# Patient Record
Sex: FEMALE | Race: WHITE | Employment: FULL TIME | URBAN - METROPOLITAN AREA
[De-identification: names, ages, dates, MRNs, and addresses within clinical notes are randomized per-mention and may not be internally consistent; named-entity substitution may affect disease eponyms.]

---

## 2024-05-21 LAB
C. TRACHOMATIS, EXTERNAL RESULT: NEGATIVE
N. GONORRHOEAE, EXTERNAL RESULT: NEGATIVE

## 2024-06-06 LAB
HEP B, EXTERNAL RESULT: NEGATIVE
HEPATITIS C ANTIBODY, EXTERNAL RESULT: NEGATIVE
HIV, EXTERNAL RESULT: NONREACTIVE
RUBELLA TITER, EXTERNAL RESULT: NORMAL

## 2024-10-22 ENCOUNTER — ANESTHESIA (OUTPATIENT)
Dept: LABOR AND DELIVERY | Age: 34
End: 2024-10-22
Payer: COMMERCIAL

## 2024-10-22 ENCOUNTER — HOSPITAL ENCOUNTER (INPATIENT)
Age: 34
LOS: 2 days | Discharge: HOME | End: 2024-10-24
Attending: OBSTETRICS & GYNECOLOGY | Admitting: OBSTETRICS & GYNECOLOGY
Payer: COMMERCIAL

## 2024-10-22 ENCOUNTER — ANESTHESIA EVENT (OUTPATIENT)
Dept: LABOR AND DELIVERY | Age: 34
End: 2024-10-22
Payer: COMMERCIAL

## 2024-10-22 PROBLEM — O14.13 PREECLAMPSIA, SEVERE, THIRD TRIMESTER: Status: ACTIVE | Noted: 2024-10-22

## 2024-10-22 LAB
ABO + RH BLD: NORMAL
ALBUMIN SERPL-MCNC: 2.9 G/DL (ref 3.4–5)
ALP SERPL-CCNC: 154 U/L (ref 40–129)
ALT SERPL-CCNC: 191 U/L (ref 10–40)
AMPHETAMINES UR QL SCN>1000 NG/ML: ABNORMAL
AST SERPL-CCNC: 307 U/L (ref 15–37)
BARBITURATES UR QL SCN>200 NG/ML: ABNORMAL
BENZODIAZ UR QL SCN>200 NG/ML: ABNORMAL
BILIRUB DIRECT SERPL-MCNC: <0.1 MG/DL (ref 0–0.3)
BILIRUB INDIRECT SERPL-MCNC: 0.2 MG/DL (ref 0–1)
BILIRUB SERPL-MCNC: 0.3 MG/DL (ref 0–1)
BLD GP AB SCN SERPL QL: NORMAL
BUN SERPL-MCNC: 9 MG/DL (ref 7–20)
CANNABINOIDS UR QL SCN>50 NG/ML: POSITIVE
COCAINE UR QL SCN: ABNORMAL
CREAT SERPL-MCNC: 0.5 MG/DL (ref 0.6–1.1)
CREAT UR-MCNC: 98.9 MG/DL (ref 28–259)
DEPRECATED RDW RBC AUTO: 16.3 % (ref 12.4–15.4)
DRUG SCREEN COMMENT UR-IMP: ABNORMAL
FENTANYL SCREEN, URINE: ABNORMAL
GFR SERPLBLD CREATININE-BSD FMLA CKD-EPI: >90 ML/MIN/{1.73_M2}
HCT VFR BLD AUTO: 32.1 % (ref 36–48)
HGB BLD-MCNC: 10.5 G/DL (ref 12–16)
MCH RBC QN AUTO: 23.9 PG (ref 26–34)
MCHC RBC AUTO-ENTMCNC: 32.7 G/DL (ref 31–36)
MCV RBC AUTO: 73.2 FL (ref 80–100)
METHADONE UR QL SCN>300 NG/ML: ABNORMAL
OPIATES UR QL SCN>300 NG/ML: POSITIVE
OXYCODONE UR QL SCN: ABNORMAL
PCP UR QL SCN>25 NG/ML: ABNORMAL
PH UR STRIP: 6 [PH]
PLATELET # BLD AUTO: 136 K/UL (ref 135–450)
PMV BLD AUTO: 10 FL (ref 5–10.5)
PROT SERPL-MCNC: 5.6 G/DL (ref 6.4–8.2)
PROT UR-MCNC: 429 MG/DL
PROT/CREAT UR-RTO: 4.3 MG/DL
RBC # BLD AUTO: 4.38 M/UL (ref 4–5.2)
REAGIN+T PALLIDUM IGG+IGM SERPL-IMP: NORMAL
WBC # BLD AUTO: 10 K/UL (ref 4–11)

## 2024-10-22 PROCEDURE — 80307 DRUG TEST PRSMV CHEM ANLYZR: CPT

## 2024-10-22 PROCEDURE — 59025 FETAL NON-STRESS TEST: CPT

## 2024-10-22 PROCEDURE — 82565 ASSAY OF CREATININE: CPT

## 2024-10-22 PROCEDURE — 1220000000 HC SEMI PRIVATE OB R&B

## 2024-10-22 PROCEDURE — 84520 ASSAY OF UREA NITROGEN: CPT

## 2024-10-22 PROCEDURE — 82570 ASSAY OF URINE CREATININE: CPT

## 2024-10-22 PROCEDURE — 86850 RBC ANTIBODY SCREEN: CPT

## 2024-10-22 PROCEDURE — 86900 BLOOD TYPING SEROLOGIC ABO: CPT

## 2024-10-22 PROCEDURE — 6360000002 HC RX W HCPCS: Performed by: OBSTETRICS & GYNECOLOGY

## 2024-10-22 PROCEDURE — 3E033VJ INTRODUCTION OF OTHER HORMONE INTO PERIPHERAL VEIN, PERCUTANEOUS APPROACH: ICD-10-PCS | Performed by: OBSTETRICS & GYNECOLOGY

## 2024-10-22 PROCEDURE — 88307 TISSUE EXAM BY PATHOLOGIST: CPT

## 2024-10-22 PROCEDURE — 86780 TREPONEMA PALLIDUM: CPT

## 2024-10-22 PROCEDURE — 84156 ASSAY OF PROTEIN URINE: CPT

## 2024-10-22 PROCEDURE — 80076 HEPATIC FUNCTION PANEL: CPT

## 2024-10-22 PROCEDURE — 87081 CULTURE SCREEN ONLY: CPT

## 2024-10-22 PROCEDURE — 6360000002 HC RX W HCPCS

## 2024-10-22 PROCEDURE — 86901 BLOOD TYPING SEROLOGIC RH(D): CPT

## 2024-10-22 PROCEDURE — 2580000003 HC RX 258: Performed by: OBSTETRICS & GYNECOLOGY

## 2024-10-22 PROCEDURE — 6370000000 HC RX 637 (ALT 250 FOR IP): Performed by: OBSTETRICS & GYNECOLOGY

## 2024-10-22 PROCEDURE — 7200000001 HC VAGINAL DELIVERY

## 2024-10-22 PROCEDURE — 85027 COMPLETE CBC AUTOMATED: CPT

## 2024-10-22 RX ORDER — SODIUM CHLORIDE 9 MG/ML
INJECTION, SOLUTION INTRAVENOUS PRN
Status: DISCONTINUED | OUTPATIENT
Start: 2024-10-22 | End: 2024-10-22

## 2024-10-22 RX ORDER — ONDANSETRON 2 MG/ML
4 INJECTION INTRAMUSCULAR; INTRAVENOUS EVERY 6 HOURS PRN
Status: DISCONTINUED | OUTPATIENT
Start: 2024-10-22 | End: 2024-10-22

## 2024-10-22 RX ORDER — SODIUM CHLORIDE 9 MG/ML
INJECTION, SOLUTION INTRAVENOUS PRN
Status: DISCONTINUED | OUTPATIENT
Start: 2024-10-22 | End: 2024-10-24 | Stop reason: HOSPADM

## 2024-10-22 RX ORDER — LABETALOL HYDROCHLORIDE 5 MG/ML
40 INJECTION, SOLUTION INTRAVENOUS
Status: COMPLETED | OUTPATIENT
Start: 2024-10-22 | End: 2024-10-22

## 2024-10-22 RX ORDER — CALCIUM GLUCONATE 94 MG/ML
1000 INJECTION, SOLUTION INTRAVENOUS PRN
Status: DISCONTINUED | OUTPATIENT
Start: 2024-10-22 | End: 2024-10-22

## 2024-10-22 RX ORDER — SODIUM CHLORIDE 0.9 % (FLUSH) 0.9 %
5-40 SYRINGE (ML) INJECTION EVERY 12 HOURS SCHEDULED
Status: DISCONTINUED | OUTPATIENT
Start: 2024-10-22 | End: 2024-10-22

## 2024-10-22 RX ORDER — NICOTINE 21 MG/24HR
1 PATCH, TRANSDERMAL 24 HOURS TRANSDERMAL DAILY
Status: DISCONTINUED | OUTPATIENT
Start: 2024-10-22 | End: 2024-10-22

## 2024-10-22 RX ORDER — NIFEDIPINE 30 MG/1
30 TABLET, EXTENDED RELEASE ORAL DAILY
Status: DISCONTINUED | OUTPATIENT
Start: 2024-10-22 | End: 2024-10-24

## 2024-10-22 RX ORDER — ONDANSETRON 2 MG/ML
4 INJECTION INTRAMUSCULAR; INTRAVENOUS EVERY 6 HOURS PRN
Status: DISCONTINUED | OUTPATIENT
Start: 2024-10-22 | End: 2024-10-24 | Stop reason: HOSPADM

## 2024-10-22 RX ORDER — OXYCODONE HYDROCHLORIDE 5 MG/1
5 TABLET ORAL EVERY 4 HOURS PRN
Status: DISCONTINUED | OUTPATIENT
Start: 2024-10-22 | End: 2024-10-24 | Stop reason: HOSPADM

## 2024-10-22 RX ORDER — SODIUM CHLORIDE 0.9 % (FLUSH) 0.9 %
5-40 SYRINGE (ML) INJECTION PRN
Status: DISCONTINUED | OUTPATIENT
Start: 2024-10-22 | End: 2024-10-22

## 2024-10-22 RX ORDER — NALOXONE HYDROCHLORIDE 0.4 MG/ML
INJECTION, SOLUTION INTRAMUSCULAR; INTRAVENOUS; SUBCUTANEOUS PRN
Status: DISCONTINUED | OUTPATIENT
Start: 2024-10-22 | End: 2024-10-22

## 2024-10-22 RX ORDER — MORPHINE SULFATE 4 MG/ML
INJECTION INTRAVENOUS
Status: COMPLETED
Start: 2024-10-22 | End: 2024-10-22

## 2024-10-22 RX ORDER — MORPHINE SULFATE 4 MG/ML
4 INJECTION, SOLUTION INTRAMUSCULAR; INTRAVENOUS
Status: COMPLETED | OUTPATIENT
Start: 2024-10-22 | End: 2024-10-22

## 2024-10-22 RX ORDER — SODIUM CHLORIDE, SODIUM LACTATE, POTASSIUM CHLORIDE, CALCIUM CHLORIDE 600; 310; 30; 20 MG/100ML; MG/100ML; MG/100ML; MG/100ML
INJECTION, SOLUTION INTRAVENOUS CONTINUOUS
Status: DISCONTINUED | OUTPATIENT
Start: 2024-10-22 | End: 2024-10-22

## 2024-10-22 RX ORDER — SODIUM CHLORIDE 0.9 % (FLUSH) 0.9 %
5-40 SYRINGE (ML) INJECTION EVERY 12 HOURS SCHEDULED
Status: DISCONTINUED | OUTPATIENT
Start: 2024-10-22 | End: 2024-10-24 | Stop reason: HOSPADM

## 2024-10-22 RX ORDER — ACETAMINOPHEN 500 MG
1000 TABLET ORAL EVERY 6 HOURS PRN
Status: DISCONTINUED | OUTPATIENT
Start: 2024-10-22 | End: 2024-10-24 | Stop reason: HOSPADM

## 2024-10-22 RX ORDER — MAGNESIUM SULFATE IN WATER 40 MG/ML
4000 INJECTION, SOLUTION INTRAVENOUS ONCE
Status: COMPLETED | OUTPATIENT
Start: 2024-10-22 | End: 2024-10-22

## 2024-10-22 RX ORDER — LABETALOL HYDROCHLORIDE 5 MG/ML
80 INJECTION, SOLUTION INTRAVENOUS
Status: COMPLETED | OUTPATIENT
Start: 2024-10-22 | End: 2024-10-22

## 2024-10-22 RX ORDER — BETAMETHASONE SODIUM PHOSPHATE AND BETAMETHASONE ACETATE 3; 3 MG/ML; MG/ML
12 INJECTION, SUSPENSION INTRA-ARTICULAR; INTRALESIONAL; INTRAMUSCULAR; SOFT TISSUE ONCE
Status: COMPLETED | OUTPATIENT
Start: 2024-10-22 | End: 2024-10-22

## 2024-10-22 RX ORDER — DOCUSATE SODIUM 100 MG/1
100 CAPSULE, LIQUID FILLED ORAL 2 TIMES DAILY
Status: DISCONTINUED | OUTPATIENT
Start: 2024-10-22 | End: 2024-10-24 | Stop reason: HOSPADM

## 2024-10-22 RX ORDER — LABETALOL HYDROCHLORIDE 5 MG/ML
20 INJECTION, SOLUTION INTRAVENOUS ONCE
Status: COMPLETED | OUTPATIENT
Start: 2024-10-22 | End: 2024-10-22

## 2024-10-22 RX ORDER — ONDANSETRON 4 MG/1
4 TABLET, ORALLY DISINTEGRATING ORAL EVERY 6 HOURS PRN
Status: DISCONTINUED | OUTPATIENT
Start: 2024-10-22 | End: 2024-10-24 | Stop reason: HOSPADM

## 2024-10-22 RX ORDER — NALBUPHINE HYDROCHLORIDE 10 MG/ML
5 INJECTION INTRAMUSCULAR; INTRAVENOUS; SUBCUTANEOUS EVERY 4 HOURS PRN
Status: DISCONTINUED | OUTPATIENT
Start: 2024-10-22 | End: 2024-10-22

## 2024-10-22 RX ORDER — HYDRALAZINE HYDROCHLORIDE 20 MG/ML
5 INJECTION INTRAMUSCULAR; INTRAVENOUS ONCE
Status: COMPLETED | OUTPATIENT
Start: 2024-10-22 | End: 2024-10-22

## 2024-10-22 RX ORDER — DIPHENHYDRAMINE HYDROCHLORIDE 50 MG/ML
25 INJECTION INTRAMUSCULAR; INTRAVENOUS EVERY 6 HOURS PRN
Status: DISCONTINUED | OUTPATIENT
Start: 2024-10-22 | End: 2024-10-22

## 2024-10-22 RX ORDER — IBUPROFEN 800 MG/1
800 TABLET, FILM COATED ORAL EVERY 8 HOURS PRN
Status: DISCONTINUED | OUTPATIENT
Start: 2024-10-22 | End: 2024-10-24 | Stop reason: HOSPADM

## 2024-10-22 RX ORDER — SODIUM CHLORIDE 0.9 % (FLUSH) 0.9 %
5-40 SYRINGE (ML) INJECTION PRN
Status: DISCONTINUED | OUTPATIENT
Start: 2024-10-22 | End: 2024-10-24 | Stop reason: HOSPADM

## 2024-10-22 RX ADMIN — Medication 87.3 MILLI-UNITS/MIN: at 17:59

## 2024-10-22 RX ADMIN — LABETALOL HYDROCHLORIDE 40 MG: 5 INJECTION, SOLUTION INTRAVENOUS at 08:48

## 2024-10-22 RX ADMIN — HYDRALAZINE HYDROCHLORIDE 5 MG: 20 INJECTION INTRAMUSCULAR; INTRAVENOUS at 09:52

## 2024-10-22 RX ADMIN — LABETALOL HYDROCHLORIDE 80 MG: 5 INJECTION INTRAVENOUS at 09:17

## 2024-10-22 RX ADMIN — BETAMETHASONE SODIUM PHOSPHATE AND BETAMETHASONE ACETATE 12 MG: 3; 3 INJECTION, SUSPENSION INTRA-ARTICULAR; INTRALESIONAL; INTRAMUSCULAR at 10:49

## 2024-10-22 RX ADMIN — MAGNESIUM SULFATE HEPTAHYDRATE 2000 MG/HR: 40 INJECTION, SOLUTION INTRAVENOUS at 09:26

## 2024-10-22 RX ADMIN — AMPICILLIN SODIUM 1000 MG: 1 INJECTION, POWDER, FOR SOLUTION INTRAMUSCULAR; INTRAVENOUS at 14:14

## 2024-10-22 RX ADMIN — Medication 166.7 ML: at 17:59

## 2024-10-22 RX ADMIN — SODIUM CHLORIDE, POTASSIUM CHLORIDE, SODIUM LACTATE AND CALCIUM CHLORIDE: 600; 310; 30; 20 INJECTION, SOLUTION INTRAVENOUS at 08:36

## 2024-10-22 RX ADMIN — MORPHINE SULFATE 4 MG: 4 INJECTION, SOLUTION INTRAMUSCULAR; INTRAVENOUS at 08:53

## 2024-10-22 RX ADMIN — IBUPROFEN 800 MG: 800 TABLET, FILM COATED ORAL at 21:14

## 2024-10-22 RX ADMIN — Medication 1 MILLI-UNITS/MIN: at 10:29

## 2024-10-22 RX ADMIN — AMPICILLIN SODIUM 2000 MG: 2 INJECTION, POWDER, FOR SOLUTION INTRAMUSCULAR; INTRAVENOUS at 10:05

## 2024-10-22 RX ADMIN — MAGNESIUM SULFATE HEPTAHYDRATE 2000 MG/HR: 40 INJECTION, SOLUTION INTRAVENOUS at 18:07

## 2024-10-22 RX ADMIN — SODIUM CHLORIDE, POTASSIUM CHLORIDE, SODIUM LACTATE AND CALCIUM CHLORIDE: 600; 310; 30; 20 INJECTION, SOLUTION INTRAVENOUS at 09:27

## 2024-10-22 RX ADMIN — MAGNESIUM SULFATE HEPTAHYDRATE 4000 MG: 40 INJECTION, SOLUTION INTRAVENOUS at 09:01

## 2024-10-22 RX ADMIN — LABETALOL HYDROCHLORIDE 20 MG: 5 INJECTION, SOLUTION INTRAVENOUS at 08:31

## 2024-10-22 RX ADMIN — NIFEDIPINE 30 MG: 30 TABLET, FILM COATED, EXTENDED RELEASE ORAL at 19:25

## 2024-10-22 RX ADMIN — ONDANSETRON 4 MG: 2 INJECTION INTRAMUSCULAR; INTRAVENOUS at 08:34

## 2024-10-22 ASSESSMENT — PAIN - FUNCTIONAL ASSESSMENT: PAIN_FUNCTIONAL_ASSESSMENT: ACTIVITIES ARE NOT PREVENTED

## 2024-10-22 ASSESSMENT — PAIN DESCRIPTION - LOCATION: LOCATION: ABDOMEN

## 2024-10-22 ASSESSMENT — PAIN SCALES - GENERAL
PAINLEVEL_OUTOF10: 10
PAINLEVEL_OUTOF10: 0

## 2024-10-22 ASSESSMENT — PAIN DESCRIPTION - DESCRIPTORS: DESCRIPTORS: CRAMPING

## 2024-10-22 NOTE — FLOWSHEET NOTE
Papito ZALDIVAR able to start 20g IV. Labetalol and Zofran given per Dr Billings orders.  Unable to get enough blood return for ordered labs. Papito ZALDIVAR remains at bedside to attempt lab collection.

## 2024-10-22 NOTE — FLOWSHEET NOTE
1817 Dr. Billings notified of /86, 162/84.  No new BP orders at this time. Mag continuing to infuse as ordered.

## 2024-10-22 NOTE — FLOWSHEET NOTE
MD Billings reviewing labs, going to bedside to speak with family, Grossman to be placed, Pitocin being started for induction

## 2024-10-22 NOTE — FLOWSHEET NOTE
PT MOVED TO ROOM 2286, ORIENTED TO ROOM, FOB PHONED AND COMING TO MD FRANCISCA IN ROOM, PT AGREEABLE TO V/E. LAB AT BS COLLECTING LABS

## 2024-10-22 NOTE — L&D DELIVERY NOTE
Department of Obstetrics and Gynecology  Induced Vaginal Delivery Note    Audrey Kirby,[id number]    PRENATAL CARE: Complicated by: HELLP, AMA, Hx PTD, Anemia    Pre-operative Diagnosis:   pregnancy <37 weeks, Induced labor, Single fetus, and HELLP       Post-operative Diagnosis: the same    Type of induction: pitocin    Additional Procedures: none     Information for the patient's :  Vijay Kirby [[id number]]                  Infant Wt:   Information for the patient's :  Vijay Kirby [[id number]]          APGARS:     Information for the patient's :  Vijay Kirby [[id number]]         Anesthesia:  none    Specimen:  Placenta sent to pathology Yes    Estimated blood loss: 100 cc     Condition: mother stable in LDR. Infant to [facility] for prematurity.    Infant Feeding: breast    Delivery Summary: Patient is Audrey Kirby  is a 33 y.o.  female at 32w5d admitted to Labor and Delivery room for induction of labor following presentation to triage with severe range BP and epigastric pain, diagnosed with HELLP. After FHT were confirmed to be reassuring the induction proceeded with pitocin by protocol. Contractions were regular, FHT reactive with moderate variability without decels. Pt did not received epidural anesthesia. Recurrent deep variables noted just prior to delivery. Progress of labor as anticipated and now fully dilated cervix. With subsequent contractions she started pushing and delivered vaginally spontaneously with no complications. Tight nuchal cord present which was delivered through. 30 Units of Pitocin in 500 ml of LR was started IV. Placenta, cord and membranes were delivered spontaneously within less than 15 minutes. Uterus was not explored.  Vaginal walls, cervix, perineum, rectum were intact on inspection. Uterus was well contracted. Vaginal sweep was done, laps count was correct. Mother left stable to recovery.     Electronically

## 2024-10-22 NOTE — PROGRESS NOTES
D/W pt starting a 2nd IV due to both pitocin and MgSO4 running.  Both myself and RN have encouraged this.  Pt refuses 2nd IV at this time.

## 2024-10-22 NOTE — FLOWSHEET NOTE
Dr. Billings notified of 160/85 @ 1222, 148/91 @ 1233. CRNA discussed second line, declines currently at this time.

## 2024-10-22 NOTE — H&P
Department of Obstetrics and Gynecology   Obstetrics History and Physical        CHIEF COMPLAINT:  my stomach hurts    HISTORY OF PRESENT ILLNESS:    Audrey Kirby  is a 33 y.o.  female at 32w5d presents with a chief complaint as above and is being admitted for suspected HELLP syndrome. States work up at 5AM with severe epigastric pain and N/V. Reports pain is 10/10. +FM, denies ctxs, LOF or VB.    Estimated Due Date: Estimated Date of Delivery: 24    PRENATAL CARE: Complicated by: AMA, Anemia, Hx PTD @ 31wks    PAST OB HISTORY:  OB History          1    Para        Term                AB        Living             SAB        IAB        Ectopic        Molar        Multiple        Live Births                  Past Medical History:        Diagnosis Date    Crohn's colitis (HCC)     Dx     Seasonal asthma      Past Surgical History:    No past surgical history on file.  Allergies:  Patient has no known allergies.  Social History:    Social History     Socioeconomic History    Marital status:      Spouse name: Not on file    Number of children: 3    Years of education: 12    Highest education level: Associate degree: academic program   Occupational History    Occupation: unemployed   Tobacco Use    Smoking status: Never    Smokeless tobacco: Never   Vaping Use    Vaping status: Never Used   Substance and Sexual Activity    Alcohol use: Never    Drug use: Yes     Types: Marijuana (Weed)     Comment: Medical    Sexual activity: Yes     Partners: Male   Other Topics Concern    Not on file   Social History Narrative    [place]oved [place] May 2019    3 children 6,8,17    17 +Girl autism     2023     instal [employer]    6 & 8 biological for     [place] investment    No Tobacco     Social Determinants of Health     Financial Resource Strain: Low Risk  (12/3/2021)    Overall Financial Resource Strain (CARDIA)     Difficulty of Paying Living Expenses: Not  16.3 10/22/2024 08:30 AM     10/22/2024 08:30 AM    MPV 10.0 10/22/2024 08:30 AM       ASSESSMENT:  33 y.o.  at 32w5d with Suspected HELLP syndrome   Bps 230-240s/110s upon arrival, s/p Labetalol 20mg, 40mg & 80mg IV   Severe epigastric pain, N/V   Labs pending  AMA  Hx PTD  Anemia - Hgb 10.5    PLAN: Admit, Mag 4g bolus followed by 2g/hr, likely will proceed with IOL once labs return, Ampicillin for GBS ppx  Labor: Routine labor orders & PIH panel  Fetus: Reassuring  GBS: Unknown    I have presented reasonable alternatives to the patient's proposed care, treatment, and services. The discussion I have done encompassed risks, benefits, and side effects related to the alternatives and the risks related to not receiving the proposed care, treatment, and services.     All questions answered. Patient wishes to proceed.     Electronically signed by Ericka Billings MD on 10/22/2024 at 9:17 AM

## 2024-10-22 NOTE — ANESTHESIA PRE PROCEDURE
Department of Anesthesiology  Preprocedure Note       Name:  Audrey Kirby   Age:  33 y.o.  :  1990                                          MRN:  [id number]         Date:  10/22/2024      Surgeon: * No surgeons listed *    Procedure: * No procedures listed *    Medications prior to admission:   Prior to Admission medications    Medication Sig Start Date End Date Taking? Authorizing Provider   albuterol sulfate HFA (PROVENTIL;VENTOLIN;PROAIR) 108 (90 Base) MCG/ACT inhaler INHALE 2 PUFFS BY MOUTH EVERY 6 HOURS AS NEEDED FOR WHEEZING 6/10/24   Sasha Ro MD   FLOVENT  MCG/ACT inhaler INHALE TWO PUFFS BY MOUTH TWICE A DAY 24   Sasha Ro MD       Current medications:    Current Facility-Administered Medications   Medication Dose Route Frequency Provider Last Rate Last Admin    ondansetron (ZOFRAN) injection 4 mg  4 mg IntraVENous Q6H PRN Ericka Billings MD   4 mg at 10/22/24 0834    lactated ringers IV soln infusion SOLN   IntraVENous Continuous Ericka Billings MD 75 mL/hr at 10/22/24 0927 New Bag at 10/22/24 0927    sodium chloride flush 0.9 % injection 5-40 mL  5-40 mL IntraVENous 2 times per day Ericka Billings MD        sodium chloride flush 0.9 % injection 5-40 mL  5-40 mL IntraVENous PRN Ericka Billings MD        0.9 % sodium chloride infusion   IntraVENous PRN Ericka Billings MD        lactated ringers IV soln infusion SOLN   IntraVENous Continuous Ericka Billings MD        sodium chloride flush 0.9 % injection 5-40 mL  5-40 mL IntraVENous 2 times per day Ericka Billings MD        sodium chloride flush 0.9 % injection 5-40 mL  5-40 mL IntraVENous PRN Ericka Billings MD        0.9 % sodium chloride infusion   IntraVENous PRN Ericka Billings MD        magnesium sulfate (68517 mg/500mL infusion)  2,000 mg/hr IntraVENous Continuous Ericka Billings MD 50 mL/hr at 10/22/24 0926 2,000 mg/hr at 10/22/24 0926    calcium gluconate 10 % injection 1,000 mg  1,000 mg

## 2024-10-22 NOTE — FLOWSHEET NOTE
Dr Billings aware of BP:186/100. RN to hold off 80 mg of Labetalol at this time. RN to repeat BP in 10 min

## 2024-10-22 NOTE — FLOWSHEET NOTE
Dr. Briggs called to notified of AROM, clear, 3 cm, planning for NCB, 20 mins away. Will keep pedi informed on labor progress. [place] plan to be in house for delivery.

## 2024-10-22 NOTE — CONSULTS
At the request of OB, I was asked to do a prenatal consult on Audrey Kirby  regarding premature birth and the associated  complications of a 32 weeks gestation delivery.      Mother is a 33 y.o. year old  4 Para 3 female admitted to L@D with concerns for HELLP. Plan to induce for vaginal delivery. Baby has looked good on monitors per OB. Got steroids x1 and on pcn for GBS unknown status.     MOTHER'S HISTORY AND LABS:  This patient's mother is not on file.        This patient's mother is not on file.    Discussed:   Fetal growth & development associated with 32 weeks gestation  2. Respiratory related complications:  RDS-etiology & treatment including surfactant administration & modes of ventilation & oxygen administration   Apnea of prematurity  3. Infection  4. Hyperbilirubinemia  5. Feeding & Growth issues:  Mother's plan-discussed breast feeding and its benefits for the baby  Immaturity of suck-swallow-breath coordination   Indications for parenteral vs. enteral nutrition  Monitoring growth  6. Thermoregulation issues     7. Prolonged NICU stay    Parent(s) given opportunity to ask questions.  Verbalized understanding of premature birth and the associated  complications.         Electronically signed by: Shari Briggs MD 10/22/2024 11:44 AM

## 2024-10-22 NOTE — FLOWSHEET NOTE
Lab to room to draw rest of labs that were unable to be drawn. Multiple attempts made by RN and CRNA

## 2024-10-22 NOTE — FLOWSHEET NOTE
Pt arrived to triage in ambulatory.  Pt complain of pain in below sternum, rates 10/10, actively puking, started today at 0500. Pt drove self to hospital.  RN and charge RN at bedside. Pt reports fetal movement. Pt placed on EFM.

## 2024-10-23 LAB
ALBUMIN SERPL-MCNC: 2.8 G/DL (ref 3.4–5)
ALBUMIN/GLOB SERPL: 1.1 {RATIO} (ref 1.1–2.2)
ALP SERPL-CCNC: 99 U/L (ref 40–129)
ALT SERPL-CCNC: 144 U/L (ref 10–40)
ANION GAP SERPL CALCULATED.3IONS-SCNC: 11 MMOL/L (ref 3–16)
AST SERPL-CCNC: 196 U/L (ref 15–37)
BILIRUB SERPL-MCNC: 0.4 MG/DL (ref 0–1)
BUN SERPL-MCNC: 7 MG/DL (ref 7–20)
CALCIUM SERPL-MCNC: 6.3 MG/DL (ref 8.3–10.6)
CHLORIDE SERPL-SCNC: 101 MMOL/L (ref 99–110)
CO2 SERPL-SCNC: 20 MMOL/L (ref 21–32)
CREAT SERPL-MCNC: 0.5 MG/DL (ref 0.6–1.1)
DEPRECATED RDW RBC AUTO: 16.9 % (ref 12.4–15.4)
GFR SERPLBLD CREATININE-BSD FMLA CKD-EPI: >90 ML/MIN/{1.73_M2}
GLUCOSE SERPL-MCNC: 105 MG/DL (ref 70–99)
HCT VFR BLD AUTO: 28.1 % (ref 36–48)
HGB BLD-MCNC: 9.1 G/DL (ref 12–16)
LDH SERPL L TO P-CCNC: 737 U/L (ref 100–190)
MAGNESIUM SERPL-MCNC: 6.25 MG/DL (ref 1.8–2.4)
MCH RBC QN AUTO: 23.4 PG (ref 26–34)
MCHC RBC AUTO-ENTMCNC: 32.2 G/DL (ref 31–36)
MCV RBC AUTO: 72.4 FL (ref 80–100)
PLATELET # BLD AUTO: 64 K/UL (ref 135–450)
PLATELET BLD QL SMEAR: ABNORMAL
PMV BLD AUTO: 9.2 FL (ref 5–10.5)
POTASSIUM SERPL-SCNC: 4.4 MMOL/L (ref 3.5–5.1)
PROT SERPL-MCNC: 5.3 G/DL (ref 6.4–8.2)
RBC # BLD AUTO: 3.88 M/UL (ref 4–5.2)
SLIDE REVIEW: ABNORMAL
SODIUM SERPL-SCNC: 132 MMOL/L (ref 136–145)
WBC # BLD AUTO: 9.9 K/UL (ref 4–11)

## 2024-10-23 PROCEDURE — 2580000003 HC RX 258: Performed by: OBSTETRICS & GYNECOLOGY

## 2024-10-23 PROCEDURE — 6370000000 HC RX 637 (ALT 250 FOR IP): Performed by: OBSTETRICS & GYNECOLOGY

## 2024-10-23 PROCEDURE — 6360000002 HC RX W HCPCS: Performed by: OBSTETRICS & GYNECOLOGY

## 2024-10-23 PROCEDURE — 85027 COMPLETE CBC AUTOMATED: CPT

## 2024-10-23 PROCEDURE — 83615 LACTATE (LD) (LDH) ENZYME: CPT

## 2024-10-23 PROCEDURE — 1220000000 HC SEMI PRIVATE OB R&B

## 2024-10-23 PROCEDURE — 83735 ASSAY OF MAGNESIUM: CPT

## 2024-10-23 PROCEDURE — 80053 COMPREHEN METABOLIC PANEL: CPT

## 2024-10-23 PROCEDURE — 59025 FETAL NON-STRESS TEST: CPT

## 2024-10-23 PROCEDURE — 36415 COLL VENOUS BLD VENIPUNCTURE: CPT

## 2024-10-23 RX ORDER — CALCIUM CARBONATE 500 MG/1
500 TABLET, CHEWABLE ORAL 3 TIMES DAILY PRN
Status: DISCONTINUED | OUTPATIENT
Start: 2024-10-23 | End: 2024-10-24 | Stop reason: HOSPADM

## 2024-10-23 RX ORDER — LABETALOL HYDROCHLORIDE 5 MG/ML
40 INJECTION, SOLUTION INTRAVENOUS
Status: DISCONTINUED | OUTPATIENT
Start: 2024-10-23 | End: 2024-10-24 | Stop reason: HOSPADM

## 2024-10-23 RX ORDER — HYDRALAZINE HYDROCHLORIDE 20 MG/ML
10 INJECTION INTRAMUSCULAR; INTRAVENOUS
Status: DISCONTINUED | OUTPATIENT
Start: 2024-10-23 | End: 2024-10-24 | Stop reason: HOSPADM

## 2024-10-23 RX ORDER — LABETALOL HYDROCHLORIDE 5 MG/ML
20 INJECTION, SOLUTION INTRAVENOUS
Status: COMPLETED | OUTPATIENT
Start: 2024-10-23 | End: 2024-10-23

## 2024-10-23 RX ORDER — LABETALOL HYDROCHLORIDE 5 MG/ML
80 INJECTION, SOLUTION INTRAVENOUS
Status: DISCONTINUED | OUTPATIENT
Start: 2024-10-23 | End: 2024-10-24 | Stop reason: HOSPADM

## 2024-10-23 RX ADMIN — DOCUSATE SODIUM 100 MG: 100 CAPSULE, LIQUID FILLED ORAL at 11:59

## 2024-10-23 RX ADMIN — SODIUM CHLORIDE 75 ML: 9 INJECTION, SOLUTION INTRAVENOUS at 00:21

## 2024-10-23 RX ADMIN — ACETAMINOPHEN 1000 MG: 500 TABLET ORAL at 21:03

## 2024-10-23 RX ADMIN — DOCUSATE SODIUM 100 MG: 100 CAPSULE, LIQUID FILLED ORAL at 21:03

## 2024-10-23 RX ADMIN — MAGNESIUM SULFATE HEPTAHYDRATE 2000 MG/HR: 40 INJECTION, SOLUTION INTRAVENOUS at 14:31

## 2024-10-23 RX ADMIN — IBUPROFEN 800 MG: 800 TABLET, FILM COATED ORAL at 04:14

## 2024-10-23 RX ADMIN — NIFEDIPINE 30 MG: 30 TABLET, FILM COATED, EXTENDED RELEASE ORAL at 15:52

## 2024-10-23 RX ADMIN — MAGNESIUM SULFATE HEPTAHYDRATE 2000 MG/HR: 40 INJECTION, SOLUTION INTRAVENOUS at 04:14

## 2024-10-23 RX ADMIN — LABETALOL HYDROCHLORIDE 20 MG: 5 INJECTION INTRAVENOUS at 17:21

## 2024-10-23 RX ADMIN — ANTACID TABLETS 500 MG: 500 TABLET, CHEWABLE ORAL at 16:03

## 2024-10-23 RX ADMIN — ACETAMINOPHEN 1000 MG: 500 TABLET ORAL at 00:23

## 2024-10-23 RX ADMIN — SODIUM CHLORIDE: 9 INJECTION, SOLUTION INTRAVENOUS at 11:28

## 2024-10-23 ASSESSMENT — PAIN - FUNCTIONAL ASSESSMENT
PAIN_FUNCTIONAL_ASSESSMENT: ACTIVITIES ARE NOT PREVENTED
PAIN_FUNCTIONAL_ASSESSMENT: ACTIVITIES ARE NOT PREVENTED

## 2024-10-23 ASSESSMENT — PAIN SCALES - GENERAL
PAINLEVEL_OUTOF10: 2
PAINLEVEL_OUTOF10: 1
PAINLEVEL_OUTOF10: 0

## 2024-10-23 ASSESSMENT — PAIN DESCRIPTION - LOCATION
LOCATION: HEAD
LOCATION: BACK

## 2024-10-23 ASSESSMENT — PAIN DESCRIPTION - DESCRIPTORS
DESCRIPTORS: ACHING
DESCRIPTORS: ACHING

## 2024-10-23 NOTE — FLOWSHEET NOTE
Pt to nursery to visit infant via wheelchair. Pt states dizziness is less intense than a couple hours ago.

## 2024-10-23 NOTE — PLAN OF CARE
Problem: Pain  Goal: Verbalizes/displays adequate comfort level or baseline comfort level  10/23/2024 0636 by Janel Florez RN  Outcome: Progressing  Flowsheets (Taken 10/23/2024 0018 by Darby Arboleda, RN)  Verbalizes/displays adequate comfort level or baseline comfort level:   Encourage patient to monitor pain and request assistance   Assess pain using appropriate pain scale   Administer analgesics based on type and severity of pain and evaluate response   Implement non-pharmacological measures as appropriate and evaluate response   Consider cultural and social influences on pain and pain management   Notify Licensed Independent Practitioner if interventions unsuccessful or patient reports new pain  10/22/2024 2139 by Darby Arboleda RN  Outcome: Progressing  Flowsheets (Taken 10/22/2024 2001)  Verbalizes/displays adequate comfort level or baseline comfort level:   Encourage patient to monitor pain and request assistance   Assess pain using appropriate pain scale   Administer analgesics based on type and severity of pain and evaluate response   Implement non-pharmacological measures as appropriate and evaluate response   Consider cultural and social influences on pain and pain management   Notify Licensed Independent Practitioner if interventions unsuccessful or patient reports new pain     Problem: Discharge Planning  Goal: Discharge to home or other facility with appropriate resources  10/23/2024 0636 by Janel Florez RN  Outcome: Progressing  10/22/2024 2139 by Darby Arboleda RN  Outcome: Progressing     Problem: Pain -   Goal: Displays adequate comfort level or baseline comfort level  Outcome: Progressing     Problem: Thermoregulation - [place]/Pediatrics  Goal: Maintains normal body temperature  Outcome: Progressing     Problem: Safety -   Goal: Free from fall injury  Outcome: Progressing     Problem: Normal   Goal:  experiences normal

## 2024-10-23 NOTE — FLOWSHEET NOTE
Pt states felt dizzy upon ambulation.  Instructed pt to call when need to ambulate to assist patient to bathroom. Pt verbalizes understanding.

## 2024-10-23 NOTE — FLOWSHEET NOTE
Pt returned back to room. Monitors reapplied. Pt denies headache at present. Call light within reach.

## 2024-10-23 NOTE — PROGRESS NOTES
Late entry  PPD1 HELLP  Resting comfortably. Only c/o feeling tired.  Soft nt  Discussed DC MGS04.  BP's now in severe range despite Procardia XL. Will activate protocol and tx w/Labetalol. Procardia XL dosing changed to am tomorrow.

## 2024-10-23 NOTE — FLOWSHEET NOTE
10/22/24 1700   Fetal Heart Rate   Mode External US   Baseline Rate 135 bpm   Baseline Classification Normal   Variability 6-25 BPM   Pattern Variable decelerations   Patient Feels Fetal Movement Yes   OB Bladder Status Non-distended

## 2024-10-23 NOTE — PLAN OF CARE
Problem: Pain  Goal: Verbalizes/displays adequate comfort level or baseline comfort level  Outcome: Progressing  Flowsheets (Taken 10/22/2024 2001)  Verbalizes/displays adequate comfort level or baseline comfort level:   Encourage patient to monitor pain and request assistance   Assess pain using appropriate pain scale   Administer analgesics based on type and severity of pain and evaluate response   Implement non-pharmacological measures as appropriate and evaluate response   Consider cultural and social influences on pain and pain management   Notify Licensed Independent Practitioner if interventions unsuccessful or patient reports new pain     Problem: Discharge Planning  Goal: Discharge to home or other facility with appropriate resources  Outcome: Progressing

## 2024-10-23 NOTE — FLOWSHEET NOTE
Pt ambulated to wheelchair and went to visit infant in nursery.  Nurse at bedside while pt visiting infant.

## 2024-10-24 VITALS
HEART RATE: 72 BPM | WEIGHT: 170 LBS | SYSTOLIC BLOOD PRESSURE: 130 MMHG | DIASTOLIC BLOOD PRESSURE: 78 MMHG | HEIGHT: 63 IN | OXYGEN SATURATION: 100 % | RESPIRATION RATE: 18 BRPM | TEMPERATURE: 99.1 F | BODY MASS INDEX: 30.12 KG/M2

## 2024-10-24 LAB
ALBUMIN SERPL-MCNC: 3 G/DL (ref 3.4–5)
ALBUMIN/GLOB SERPL: 1.2 {RATIO} (ref 1.1–2.2)
ALP SERPL-CCNC: 96 U/L (ref 40–129)
ALT SERPL-CCNC: 80 U/L (ref 10–40)
ANION GAP SERPL CALCULATED.3IONS-SCNC: 9 MMOL/L (ref 3–16)
AST SERPL-CCNC: 54 U/L (ref 15–37)
BILIRUB SERPL-MCNC: 0.3 MG/DL (ref 0–1)
BUN SERPL-MCNC: 7 MG/DL (ref 7–20)
CALCIUM SERPL-MCNC: 6.7 MG/DL (ref 8.3–10.6)
CHLORIDE SERPL-SCNC: 103 MMOL/L (ref 99–110)
CO2 SERPL-SCNC: 22 MMOL/L (ref 21–32)
CREAT SERPL-MCNC: 0.5 MG/DL (ref 0.6–1.1)
DEPRECATED RDW RBC AUTO: 17.4 % (ref 12.4–15.4)
GFR SERPLBLD CREATININE-BSD FMLA CKD-EPI: >90 ML/MIN/{1.73_M2}
GLUCOSE SERPL-MCNC: 76 MG/DL (ref 70–99)
GP B STREP SPEC QL CULT: NORMAL
HCT VFR BLD AUTO: 28.2 % (ref 36–48)
HGB BLD-MCNC: 9 G/DL (ref 12–16)
MCH RBC QN AUTO: 23.3 PG (ref 26–34)
MCHC RBC AUTO-ENTMCNC: 31.9 G/DL (ref 31–36)
MCV RBC AUTO: 72.8 FL (ref 80–100)
PLATELET # BLD AUTO: 67 K/UL (ref 135–450)
PMV BLD AUTO: 8.6 FL (ref 5–10.5)
POTASSIUM SERPL-SCNC: 4.6 MMOL/L (ref 3.5–5.1)
PROT SERPL-MCNC: 5.6 G/DL (ref 6.4–8.2)
RBC # BLD AUTO: 3.86 M/UL (ref 4–5.2)
SODIUM SERPL-SCNC: 134 MMOL/L (ref 136–145)
WBC # BLD AUTO: 8.6 K/UL (ref 4–11)

## 2024-10-24 PROCEDURE — 85027 COMPLETE CBC AUTOMATED: CPT

## 2024-10-24 PROCEDURE — 80053 COMPREHEN METABOLIC PANEL: CPT

## 2024-10-24 PROCEDURE — 36415 COLL VENOUS BLD VENIPUNCTURE: CPT

## 2024-10-24 PROCEDURE — 6370000000 HC RX 637 (ALT 250 FOR IP): Performed by: OBSTETRICS & GYNECOLOGY

## 2024-10-24 RX ORDER — BLOOD PRESSURE TEST KIT
1 KIT MISCELLANEOUS 2 TIMES DAILY
Qty: 1 KIT | Refills: 0 | Status: SHIPPED | OUTPATIENT
Start: 2024-10-24

## 2024-10-24 RX ORDER — NIFEDIPINE 30 MG/1
30 TABLET, EXTENDED RELEASE ORAL 2 TIMES DAILY
Qty: 30 TABLET | Refills: 3 | Status: SHIPPED | OUTPATIENT
Start: 2024-10-24

## 2024-10-24 RX ORDER — NIFEDIPINE 30 MG/1
30 TABLET, EXTENDED RELEASE ORAL 2 TIMES DAILY
Status: DISCONTINUED | OUTPATIENT
Start: 2024-10-24 | End: 2024-10-24 | Stop reason: HOSPADM

## 2024-10-24 RX ADMIN — NIFEDIPINE 30 MG: 30 TABLET, FILM COATED, EXTENDED RELEASE ORAL at 10:42

## 2024-10-24 NOTE — PLAN OF CARE
Problem: Pain  Goal: Verbalizes/displays adequate comfort level or baseline comfort level  Outcome: Adequate for Discharge     Problem: Pain -   Goal: Displays adequate comfort level or baseline comfort level  Outcome: HH/HSPC Progressing

## 2024-10-24 NOTE — FLOWSHEET NOTE
Patient awake in bathroom, additional underwear and pads provided per patient request. No additional needs at this time. RN to continue to monitor.

## 2024-10-24 NOTE — FLOWSHEET NOTE
Patient resting in bed. Infant in open crib and safe sleep noted. FOB resting at the bedside. Call light with in reach. Patient denies needs at this time. RN to continue to monitor.

## 2024-10-24 NOTE — PROGRESS NOTES
Department of Obstetrics and Gynecology  Vaginal Delivery Postpartum Rounds    SUBJECTIVE:  Pain is controlled with Tylenol. Her lochia is normal.    OBJECTIVE:  Vital Signs: /78   Pulse 72   Temp 98.2 °F (36.8 °C) (Oral)   Resp 18   Ht 1.6 m (5' 3\")   Wt 77.1 kg (170 lb)   SpO2 100%   Breastfeeding Unknown   BMI 30.11 kg/m²   Appearance/Psychiatric: awake, alert, cooperative, no apparent distress, appears stated age  Constitutional: The patient is well nourished.  Cardiovascular: She does not have edema.  Respiratory: Respiratory effort is normal.  Gastrointestinal: Soft, non tender, uterine fundus is firm below umbilicus  Extremities: nontender to palpation  Perineum: intact     LABS / IMAGING:      Lab Results   Component Value Date/Time    WBC 9.9 10/23/2024 04:33 AM    RBC 3.88 10/23/2024 04:33 AM    HGB 9.1 10/23/2024 04:33 AM    HCT 28.1 10/23/2024 04:33 AM    MCV 72.4 10/23/2024 04:33 AM    MCH 23.4 10/23/2024 04:33 AM    MCHC 32.2 10/23/2024 04:33 AM    RDW 16.9 10/23/2024 04:33 AM    PLT 64 10/23/2024 04:33 AM    MPV 9.2 10/23/2024 04:33 AM     Lab Results   Component Value Date/Time     10/23/2024 04:33 AM    K 4.4 10/23/2024 04:33 AM     10/23/2024 04:33 AM    CO2 20 10/23/2024 04:33 AM    BUN 7 10/23/2024 04:33 AM    CREATININE 0.5 10/23/2024 04:33 AM    GLUCOSE 105 10/23/2024 04:33 AM    CALCIUM 6.3 10/23/2024 04:33 AM     No results found for: \"POCGLU\"  Lab Results   Component Value Date/Time    ALKPHOS 99 10/23/2024 04:33 AM     10/23/2024 04:33 AM     10/23/2024 04:33 AM    BILITOT 0.4 10/23/2024 04:33 AM    BILIDIR <0.1 10/22/2024 08:30 AM         ASSESSMENT:    Postpartum Day 2 s/p     PLAN:   1. Continue routine postpartum care   2. Discharge home on Postpartum Day 2 if platelets stable.  3. Return to office in 2 weeks for BP check.  4. Postpartum instructions reviewed and all patient's Questions answered    Electronically signed by Belen Haley

## 2024-10-24 NOTE — FLOWSHEET NOTE
Assessment completed and vitals obtained, findings WDL, updated white board. Plan of care for the day discussed, patient verbalized understanding and had no further questions.

## 2024-10-24 NOTE — DISCHARGE INSTRUCTIONS
Department of Obstetrics and Gynecology  Vaginal Delivery Postpartum Discharge instructions    You will need a postpartum visit in the clinic 6 weeks after your delivery.    Please call office [phone] to schedule an appointment:      Please call the office or the OB/GYN on-call if after-hours for any of the followin) Fever - a temperature greater than 100.4  2) Uncontrolled pain  3) Uncontrolled bleeding (soaking more than 1 pad in an hour)  4) Foul-smelling discharge from the vagina    Do not place anything in the vagina - this includes tampons, douches or having sex - until after your 6 week postpartum visit to prevent infection.        Thank you for the opportunity to care for you and your family.   We hope we always exceeded your expectations and provided very good care during your stay in the [facility]. We want to ensure that you have the help you need when you leave the hospital. If there is anything we can assist you with please let us know.      Please call and schedule an appointment to be seen by your obstetrician as scheduled. You will need to call and make your own appointment.    For breastfeeding moms, you can contact our lactation consultants with any problems or questions.  Please call [phone] for questions.    Diet  Eat a well balanced diet focusing on foods high in fiber and protein.  Drink plenty of fluids, especially water.  To avoid constipation you may take a mild stool softener as recommended by your doctor or midwife.     Activity  Gradually increase your activity.  Resume exercise only after advise by your doctor or midwife.  Avoid lifting anything heavier than your baby for 2 weeks.   Avoid driving for 5-7 days or when not taking narcotics.   Rise slowly from a lying to sitting and then a standing position.  Climb stairs one at a time. Use caution when carrying your baby up and down the stairs.  NO SEXUAL activity for 6 weeks or until advised by your doctor.  Nothing in

## 2024-10-24 NOTE — FLOWSHEET NOTE
Postpartum and infant care teaching completed and forms given to patient. Patient verbalized understanding of all teaching points. Prescriptions given if applicable. Patient plans to follow-up with OB Provider as instructed. Patient given BP cuff per MD, verbalizes understanding of discharge instructions and denies further questions. Patient discharged in stable condition, patient rooming in room 2263 because infant in is SCN.

## 2024-10-24 NOTE — DISCHARGE SUMMARY
Department of Obstetrics and Gynecology  Postpartum Discharge Summary      Admit Date: 10/22/2024    Admit Diagnosis: Preeclampsia, severe, third trimester [O14.13]  Mild to moderate pre-eclampsia, delivered [O14.04] HELLP syndrome    Discharge Date:   10/24/2024    Discharge Diagnoses: spontaneous vaginal delivery       Medication List        START taking these medications      Blood Pressure Kit  1 kit by Does not apply route 2 times daily     NIFEdipine 30 MG extended release tablet  Commonly known as: PROCARDIA XL  Take 1 tablet by mouth 2 times daily            CONTINUE taking these medications      albuterol sulfate  (90 Base) MCG/ACT inhaler  Commonly known as: PROVENTIL;VENTOLIN;PROAIR  INHALE 2 PUFFS BY MOUTH EVERY 6 HOURS AS NEEDED FOR WHEEZING            ASK your doctor about these medications      Flovent  MCG/ACT inhaler  Generic drug: fluticasone  INHALE TWO PUFFS BY MOUTH TWICE A DAY               Where to Get Your Medications        These medications were sent to [id number] - [place], [place] - [address] - P [phone] F [phone]  [address] [place] [address] [place] [address]      Phone: [phone]   Blood Pressure Kit  NIFEdipine 30 MG extended release tablet         Service: Obstetrics    Consults: none    Significant Diagnostic Studies: none    Postpartum complications: none     Condition at Discharge: good    Hospital Course:  Magnesium sulfate 24 hours postpartum. Platelet mariola at 64.     Discharge Instructions:     Activity: as tolerated    Diet: regular diet    Instructions: No intercourse and nothing in the vagina for 6 weeks. Do not drive while using pain medications. Keep any wounds clean and dry    Discharge to: Home    Disposition / Follow up: Return to office in 2 weeks for BP check. Pt given BP cuff to check BPs at home.  Procardia 30 mg XL BID.    Home Health Nurse visit within 24-48 h if qualifies     Data:  Apgars:  Information for the patient's :

## 2024-10-28 ENCOUNTER — LACTATION ENCOUNTER (OUTPATIENT)
Dept: INPATIENT UNIT | Age: 34
End: 2024-10-28

## 2024-10-28 NOTE — LACTATION NOTE
This note was copied from a baby's chart.  Lactation Consult Note    Data: Consult received and appreciated. LC reviewed chart and spoke with bedside RN.    Maternal History: h/o asthma, Crohn's colitis, +THC    OB/Delivery Risks for Lactation: HELLP,  delivery, Magnesium Sulfate; breast fed her last baby for 1 year    Breast pump for home use: mom cozy    Action: LC met with mom in infant's SCN  room. Introduced self and lactation services. Mother agreeable to consult at this time.  Mother holding infant skin to skin. Infant resting quietly and comfortably. Mother states has been pumping every 3-4 hours and obtains 3-4 oz per pump session. Mother states is using size 19 mm flanges on her mom cozy pump. Mother's goal is to breastfeed but is open to bottle feeding as well.   Mother would like to breastfeed at this time with LC assist.   Infant placed in cross cradle hold to right breast, using pillows for support. Reviewed positioning and latching techniques. Reviewed where to hold infant's head/body for support as well as where to hold her breast. Infant sleepy so encouraged mother to hand express milk to coax her to latch. She licked and opened and latched on with few sucks, one swallow noted. Once she fell asleep, Sadia RN at bedside for gavage, while infant skin to skin with mom.  DORA, Dr. Booker and Sadia RN present for rounds. Plan of care discussed. Breastfeeding sliding scale reviewed with mother. New feeding orders noted.   When mother was finished with skin to skin, LC assisted with pumping session. LC provided symphony pump and all needed supplies. Observed mother pumping with size 18 mm flanges which mother notes are comfortable. They seemed a good fit at first but then became tight a few minutes into the pumping session. LC suggested and mother agreed to try size 21 mm flanges which were noted to be a better fit and mother states are more comfortable. Provided hands free pumping bra.  Encouraged

## 2024-10-29 ENCOUNTER — LACTATION ENCOUNTER (OUTPATIENT)
Dept: INPATIENT UNIT | Age: 34
End: 2024-10-29

## 2024-10-29 NOTE — LACTATION NOTE
This note was copied from a baby's chart.  Lactation Progress Note    Data: Follow up.    Action: LC met with mother at infant's bedside in SCN room. Mother holding sleeping, swaddled baby who is showing no hunger cues at this time. Mother states is pumping every 3-4 hours and obtains 4 oz per pump session consistently. Mother states she tried breastfeeding earlier since she was awake and cueing and she latched for about 4 minutes. Mother states she is working on getting more comfortable with holding and positioning since infant is so little.   Encouraged mother to call for LC for breastfeeding attempts as needed for support.  Mother states has all needed supplies.  Praised mother's efforts.  Updated bedside RN.    Response:  Mother verbalizes understanding of information given and denies further needs at this time.  Mother states will call as needed.    Linda ESPINOSAN, RN, IBCLC  Lactation Consultant